# Patient Record
Sex: FEMALE | Race: WHITE | NOT HISPANIC OR LATINO | URBAN - METROPOLITAN AREA
[De-identification: names, ages, dates, MRNs, and addresses within clinical notes are randomized per-mention and may not be internally consistent; named-entity substitution may affect disease eponyms.]

---

## 2020-07-25 ENCOUNTER — TELEPHONE ENCOUNTER (OUTPATIENT)
Dept: URBAN - METROPOLITAN AREA CLINIC 13 | Facility: CLINIC | Age: 65
End: 2020-07-25

## 2020-07-25 RX ORDER — ESOMEPRAZOLE MAGNESIUM 40 MG/1
TAKE 1 CAPSULE ONCE DAILY CAPSULE, DELAYED RELEASE PELLETS ORAL
Refills: 0 | OUTPATIENT
Start: 2017-09-25 | End: 2018-05-17

## 2020-07-25 RX ORDER — ESOMEPRAZOLE MAGNESIUM 40 MG/1
TAKE ONE CAPSULE BY MOUTH TWICE A DAY CAPSULE, DELAYED RELEASE PELLETS ORAL
Qty: 180 | Refills: 3 | OUTPATIENT
Start: 2018-01-15 | End: 2019-01-29

## 2020-07-26 ENCOUNTER — TELEPHONE ENCOUNTER (OUTPATIENT)
Dept: URBAN - METROPOLITAN AREA CLINIC 13 | Facility: CLINIC | Age: 65
End: 2020-07-26

## 2020-07-26 RX ORDER — PRAVASTATIN SODIUM 40 MG/1
TAKE 1 TABLET AT BEDTIME TABLET ORAL
Refills: 0 | Status: ACTIVE | COMMUNITY
Start: 2016-11-21

## 2020-07-26 RX ORDER — CYCLOSPORINE 0.5 MG/ML
INSTILL 1 DROP IN BOTH EYES EVERY 12 HOURS DAILY EMULSION OPHTHALMIC
Refills: 0 | Status: ACTIVE | COMMUNITY
Start: 2016-12-01

## 2020-07-26 RX ORDER — AZITHROMYCIN DIHYDRATE 250 MG/1
TABLET, FILM COATED ORAL
Qty: 11 | Refills: 0 | Status: ACTIVE | COMMUNITY
Start: 2017-05-30

## 2020-07-26 RX ORDER — ESTRADIOL 0.05 MG/D
APPLY 1 PATCH TWICE WEEKLY AS DIRECTED FILM, EXTENDED RELEASE TRANSDERMAL
Refills: 0 | Status: ACTIVE | COMMUNITY
Start: 2017-01-23

## 2020-07-26 RX ORDER — CELECOXIB 100 MG/1
TAKE 1 CAPSULE TWICE DAILY AS NEEDED CAPSULE ORAL
Refills: 0 | Status: ACTIVE | COMMUNITY
Start: 2017-02-17

## 2020-07-26 RX ORDER — VALSARTAN AND HYDROCHLOROTHIAZIDE 160; 12.5 MG/1; MG/1
TAKE 1 TABLET DAILY TABLET, FILM COATED ORAL
Refills: 0 | Status: ACTIVE | COMMUNITY
Start: 2017-02-24

## 2020-07-26 RX ORDER — DEXLANSOPRAZOLE 60 MG/1
CAPSULE, DELAYED RELEASE ORAL
Qty: 90 | Refills: 0 | Status: ACTIVE | COMMUNITY
Start: 2016-11-21

## 2020-07-26 RX ORDER — EPINEPHRINE 0.3 MG/.3ML
INJECT 0.3ML INTRAMUSCULARLY AS DIRECTED INJECTION INTRAMUSCULAR
Refills: 0 | Status: ACTIVE | COMMUNITY
Start: 2016-12-20

## 2020-07-26 RX ORDER — PREDNISONE 5 MG/1
TABLET ORAL
Qty: 55 | Refills: 0 | Status: ACTIVE | COMMUNITY
Start: 2017-01-13

## 2020-07-26 RX ORDER — AZELASTINE HYDROCHLORIDE AND FLUTICASONE PROPIONATE 137; 50 UG/1; UG/1
SPRAY, METERED NASAL
Qty: 23 | Refills: 0 | Status: ACTIVE | COMMUNITY
Start: 2016-12-21

## 2020-07-26 RX ORDER — RABEPRAZOLE SODIUM 20 MG/1
TAKE ONE TABLET BY MOUTH TWICE A DAY BEFORE MEALS TABLET, DELAYED RELEASE ORAL
Qty: 180 | Refills: 2 | Status: ACTIVE | COMMUNITY
Start: 2019-01-29

## 2020-07-26 RX ORDER — TIZANIDINE 4 MG/1
TAKE 1 OR 2 TABLETS EVERY 8 HOURS AS NEEDED TABLET ORAL
Refills: 0 | Status: ACTIVE | COMMUNITY
Start: 2017-03-27

## 2020-07-26 RX ORDER — IPRATROPIUM BROMIDE 21 UG/1
USE 2 SPRAYS IN EACH NOSTRIL 2-3 TIMES DAILY SPRAY, METERED NASAL
Refills: 0 | Status: ACTIVE | COMMUNITY
Start: 2016-12-21

## 2020-07-26 RX ORDER — BUDESONIDE AND FORMOTEROL FUMARATE DIHYDRATE 80; 4.5 UG/1; UG/1
INHALE 2 PUFFS TWICE DAILY. RINSE MOUTH AFTER USE AEROSOL RESPIRATORY (INHALATION)
Refills: 0 | Status: ACTIVE | COMMUNITY
Start: 2016-12-23

## 2020-07-26 RX ORDER — AZITHROMYCIN DIHYDRATE 250 MG/1
TABLET, FILM COATED ORAL
Qty: 21 | Refills: 0 | Status: ACTIVE | COMMUNITY
Start: 2017-01-13

## 2020-07-26 RX ORDER — GUAIFENESIN 600 MG/1
TAKE 1 OR 2 TABLETS TWICE DAILY AS NEEDED FOR CONGESTION TABLET, EXTENDED RELEASE ORAL
Refills: 0 | Status: ACTIVE | COMMUNITY

## 2020-07-26 RX ORDER — CEFUROXIME AXETIL 500 MG/1
TABLET, FILM COATED ORAL
Qty: 20 | Refills: 0 | Status: ACTIVE | COMMUNITY
Start: 2017-01-06

## 2020-07-26 RX ORDER — AZELASTINE HYDROCHLORIDE AND FLUTICASONE PROPIONATE 137; 50 UG/1; UG/1
SPRAY, METERED NASAL
Qty: 23 | Refills: 0 | Status: ACTIVE | COMMUNITY
Start: 2017-01-23

## 2020-07-26 RX ORDER — AZELASTINE HYDROCHLORIDE AND FLUTICASONE PROPIONATE 137; 50 UG/1; UG/1
INSTILL 1 SQUIRT TWICE DAILY SPRAY, METERED NASAL
Refills: 0 | Status: ACTIVE | COMMUNITY

## 2020-07-26 RX ORDER — FUROSEMIDE 20 MG/1
TABLET ORAL
Qty: 30 | Refills: 0 | Status: ACTIVE | COMMUNITY
Start: 2017-08-24

## 2020-07-26 RX ORDER — IPRATROPIUM BROMIDE 42 UG/1
SPRAY, METERED NASAL
Qty: 15 | Refills: 0 | Status: ACTIVE | COMMUNITY
Start: 2017-10-10

## 2020-07-26 RX ORDER — PREDNISONE 10 MG/1
TABLET ORAL
Qty: 10 | Refills: 0 | Status: ACTIVE | COMMUNITY
Start: 2016-12-20

## 2020-07-26 RX ORDER — RABEPRAZOLE SODIUM 20 MG/1
TABLET, DELAYED RELEASE ORAL
Qty: 30 | Refills: 0 | Status: ACTIVE | COMMUNITY
Start: 2017-05-16

## 2020-07-26 RX ORDER — RABEPRAZOLE SODIUM 20 MG/1
TABLET, DELAYED RELEASE ORAL
Qty: 30 | Refills: 0 | Status: ACTIVE | COMMUNITY
Start: 2017-06-20

## 2020-07-26 RX ORDER — DULOXETINE 30 MG/1
CAPSULE, DELAYED RELEASE PELLETS ORAL
Qty: 30 | Refills: 0 | Status: ACTIVE | COMMUNITY
Start: 2017-08-24

## 2020-07-26 RX ORDER — ESZOPICLONE 2 MG/1
TABLET, FILM COATED ORAL
Qty: 15 | Refills: 0 | Status: ACTIVE | COMMUNITY
Start: 2017-08-25

## 2020-07-26 RX ORDER — LEVOTHYROXINE SODIUM 0.07 MG/1
TAKE 1 TABLET DAILY TABLET ORAL
Refills: 0 | Status: ACTIVE | COMMUNITY
Start: 2017-09-25

## 2020-07-26 RX ORDER — CITALOPRAM 40 MG/1
TAKE 1 TABLET DAILY TABLET ORAL
Refills: 0 | Status: ACTIVE | COMMUNITY
Start: 2016-12-16

## 2020-07-26 RX ORDER — MONTELUKAST SODIUM 10 MG/1
TAKE 1 TABLET AT BEDTIME TABLET, FILM COATED ORAL
Refills: 0 | Status: ACTIVE | COMMUNITY
Start: 2017-01-30

## 2022-01-28 ENCOUNTER — LAB OUTSIDE AN ENCOUNTER (OUTPATIENT)
Dept: URBAN - METROPOLITAN AREA CLINIC 113 | Facility: CLINIC | Age: 67
End: 2022-01-28

## 2022-01-28 ENCOUNTER — DASHBOARD ENCOUNTERS (OUTPATIENT)
Age: 67
End: 2022-01-28

## 2022-01-28 ENCOUNTER — WEB ENCOUNTER (OUTPATIENT)
Dept: URBAN - METROPOLITAN AREA CLINIC 113 | Facility: CLINIC | Age: 67
End: 2022-01-28

## 2022-01-28 ENCOUNTER — OFFICE VISIT (OUTPATIENT)
Dept: URBAN - METROPOLITAN AREA CLINIC 113 | Facility: CLINIC | Age: 67
End: 2022-01-28
Payer: MEDICARE

## 2022-01-28 VITALS
HEIGHT: 65 IN | BODY MASS INDEX: 36.32 KG/M2 | TEMPERATURE: 97.5 F | SYSTOLIC BLOOD PRESSURE: 147 MMHG | DIASTOLIC BLOOD PRESSURE: 81 MMHG | HEART RATE: 91 BPM | WEIGHT: 218 LBS | RESPIRATION RATE: 20 BRPM

## 2022-01-28 DIAGNOSIS — K21.9 GASTROESOPHAGEAL REFLUX DISEASE WITHOUT ESOPHAGITIS: ICD-10-CM

## 2022-01-28 DIAGNOSIS — L98.9 SKIN ABNORMALITY: ICD-10-CM

## 2022-01-28 DIAGNOSIS — R19.4 CHANGE IN BOWEL HABITS: ICD-10-CM

## 2022-01-28 DIAGNOSIS — K59.09 OTHER CONSTIPATION: ICD-10-CM

## 2022-01-28 DIAGNOSIS — R11.0 NAUSEA: ICD-10-CM

## 2022-01-28 DIAGNOSIS — K62.89 PROCTALGIA: ICD-10-CM

## 2022-01-28 PROCEDURE — 99204 OFFICE O/P NEW MOD 45 MIN: CPT | Performed by: NURSE PRACTITIONER

## 2022-01-28 RX ORDER — ESOMEPRAZOLE MAGNESIUM 40 MG/1
1 CAPSULE CAPSULE, DELAYED RELEASE ORAL TWICE A DAY
Status: ACTIVE | COMMUNITY

## 2022-01-28 RX ORDER — FUROSEMIDE 20 MG/1
TABLET ORAL
Qty: 30 | Refills: 0 | Status: ON HOLD | COMMUNITY
Start: 2017-08-24

## 2022-01-28 RX ORDER — PREDNISONE 10 MG/1
TABLET ORAL
Qty: 10 | Refills: 0 | Status: ON HOLD | COMMUNITY
Start: 2016-12-20

## 2022-01-28 RX ORDER — CELECOXIB 100 MG/1
TAKE 1 CAPSULE TWICE DAILY AS NEEDED CAPSULE ORAL
Refills: 0 | Status: ON HOLD | COMMUNITY
Start: 2017-02-17

## 2022-01-28 RX ORDER — VALSARTAN AND HYDROCHLOROTHIAZIDE 80; 12.5 MG/1; MG/1
1 TABLET TABLET, FILM COATED ORAL ONCE A DAY
Status: ACTIVE | COMMUNITY

## 2022-01-28 RX ORDER — SULFASALAZINE 500 MG/1
2 TABLETS TABLET ORAL TWICE DAILY
Status: ACTIVE | COMMUNITY

## 2022-01-28 RX ORDER — MONTELUKAST SODIUM 10 MG/1
TAKE 1 TABLET AT BEDTIME TABLET, FILM COATED ORAL
Refills: 0 | Status: ACTIVE | COMMUNITY
Start: 2017-01-30

## 2022-01-28 RX ORDER — GOLIMUMAB 50 MG/4ML
AS DIRECTED SOLUTION INTRAVENOUS
Status: ACTIVE | COMMUNITY

## 2022-01-28 RX ORDER — DEXLANSOPRAZOLE 60 MG/1
CAPSULE, DELAYED RELEASE ORAL
Qty: 90 | Refills: 0 | Status: ON HOLD | COMMUNITY
Start: 2016-11-21

## 2022-01-28 RX ORDER — ESTRADIOL 0.05 MG/D
APPLY 1 PATCH TWICE WEEKLY AS DIRECTED FILM, EXTENDED RELEASE TRANSDERMAL
Refills: 0 | Status: ACTIVE | COMMUNITY
Start: 2017-01-23

## 2022-01-28 RX ORDER — CYCLOSPORINE 0.5 MG/ML
INSTILL 1 DROP IN BOTH EYES EVERY 12 HOURS DAILY EMULSION OPHTHALMIC
Refills: 0 | Status: ACTIVE | COMMUNITY
Start: 2016-12-01

## 2022-01-28 RX ORDER — AZITHROMYCIN DIHYDRATE 250 MG/1
TABLET, FILM COATED ORAL
Qty: 21 | Refills: 0 | Status: ON HOLD | COMMUNITY
Start: 2017-01-13

## 2022-01-28 RX ORDER — VALSARTAN AND HYDROCHLOROTHIAZIDE 160; 12.5 MG/1; MG/1
TAKE 1 TABLET DAILY TABLET, FILM COATED ORAL
Refills: 0 | Status: ON HOLD | COMMUNITY
Start: 2017-02-24

## 2022-01-28 RX ORDER — DULOXETINE 30 MG/1
1 CAPSULE CAPSULE, DELAYED RELEASE PELLETS ORAL TWICE A DAY
Refills: 0 | Status: ACTIVE | COMMUNITY
Start: 2017-08-24

## 2022-01-28 RX ORDER — GUAIFENESIN 600 MG/1
TAKE 1 OR 2 TABLETS TWICE DAILY AS NEEDED FOR CONGESTION TABLET, EXTENDED RELEASE ORAL
Refills: 0 | Status: ON HOLD | COMMUNITY

## 2022-01-28 RX ORDER — METAXALONE 800 MG/1
1 TABLET TABLET ORAL THREE TIMES A DAY
Status: ACTIVE | COMMUNITY

## 2022-01-28 RX ORDER — SODIUM, POTASSIUM,MAG SULFATES 17.5-3.13G
354 ML SOLUTION, RECONSTITUTED, ORAL ORAL ONCE
Qty: 354 MILLILITER | Refills: 0 | OUTPATIENT
Start: 2022-01-28 | End: 2022-01-29

## 2022-01-28 RX ORDER — PRAVASTATIN SODIUM 40 MG/1
TAKE 1 TABLET AT BEDTIME TABLET ORAL
Refills: 0 | Status: ACTIVE | COMMUNITY
Start: 2016-11-21

## 2022-01-28 RX ORDER — IPRATROPIUM BROMIDE 21 UG/1
USE 2 SPRAYS IN EACH NOSTRIL 2-3 TIMES DAILY SPRAY, METERED NASAL
Refills: 0 | Status: ON HOLD | COMMUNITY
Start: 2016-12-21

## 2022-01-28 RX ORDER — IPRATROPIUM BROMIDE 42 UG/1
SPRAY, METERED NASAL
Qty: 15 | Refills: 0 | Status: ON HOLD | COMMUNITY
Start: 2017-10-10

## 2022-01-28 RX ORDER — DULOXETINE 60 MG/1
1 CAPSULE CAPSULE, DELAYED RELEASE PELLETS ORAL TWICE A DAY
Status: ACTIVE | COMMUNITY

## 2022-01-28 RX ORDER — PREDNISONE 5 MG/1
TABLET ORAL
Qty: 55 | Refills: 0 | Status: ON HOLD | COMMUNITY
Start: 2017-01-13

## 2022-01-28 RX ORDER — AMLODIPINE BESYLATE 2.5 MG
1 TABLET TABLET ORAL ONCE A DAY
Status: ACTIVE | COMMUNITY

## 2022-01-28 RX ORDER — CEFUROXIME AXETIL 500 MG/1
TABLET, FILM COATED ORAL
Qty: 20 | Refills: 0 | Status: ON HOLD | COMMUNITY
Start: 2017-01-06

## 2022-01-28 RX ORDER — CITALOPRAM 40 MG/1
TAKE 1 TABLET DAILY TABLET ORAL
Refills: 0 | Status: ON HOLD | COMMUNITY
Start: 2016-12-16

## 2022-01-28 RX ORDER — BUDESONIDE AND FORMOTEROL FUMARATE DIHYDRATE 80; 4.5 UG/1; UG/1
INHALE 2 PUFFS TWICE DAILY. RINSE MOUTH AFTER USE AEROSOL RESPIRATORY (INHALATION)
Refills: 0 | Status: ACTIVE | COMMUNITY
Start: 2016-12-23

## 2022-01-28 RX ORDER — TIZANIDINE 4 MG/1
TAKE 1 OR 2 TABLETS EVERY 8 HOURS AS NEEDED TABLET ORAL
Refills: 0 | Status: ON HOLD | COMMUNITY
Start: 2017-03-27

## 2022-01-28 RX ORDER — DICLOFENAC SODIUM 75 MG/1
1 TABLET AS NEEDED TABLET, DELAYED RELEASE ORAL TWICE A DAY
Status: ACTIVE | COMMUNITY

## 2022-01-28 RX ORDER — FLUTICASONE PROPIONATE 50 UG/1
1 SPRAY IN EACH NOSTRIL SPRAY, METERED NASAL ONCE A DAY
Status: ACTIVE | COMMUNITY

## 2022-01-28 RX ORDER — ESZOPICLONE 2 MG/1
TABLET, FILM COATED ORAL
Qty: 15 | Refills: 0 | Status: ON HOLD | COMMUNITY
Start: 2017-08-25

## 2022-01-28 RX ORDER — AZELASTINE HYDROCHLORIDE AND FLUTICASONE PROPIONATE 137; 50 UG/1; UG/1
INSTILL 1 SQUIRT TWICE DAILY SPRAY, METERED NASAL
Refills: 0 | Status: ON HOLD | COMMUNITY

## 2022-01-28 RX ORDER — RABEPRAZOLE SODIUM 20 MG/1
TABLET, DELAYED RELEASE ORAL
Qty: 30 | Refills: 0 | Status: ON HOLD | COMMUNITY
Start: 2017-05-16

## 2022-01-28 RX ORDER — EPINEPHRINE 0.3 MG/.3ML
INJECT 0.3ML INTRAMUSCULARLY AS DIRECTED INJECTION INTRAMUSCULAR
Refills: 0 | Status: ON HOLD | COMMUNITY
Start: 2016-12-20

## 2022-01-28 RX ORDER — LEVOTHYROXINE SODIUM 0.09 MG/1
TAKE 1 TABLET DAILY TABLET ORAL
Refills: 0 | Status: ON HOLD | COMMUNITY
Start: 2017-09-25

## 2022-01-28 RX ORDER — CETIRIZINE HYDROCHLORIDE 10 MG/1
1 TABLET TABLET, FILM COATED ORAL ONCE A DAY
Status: ACTIVE | COMMUNITY

## 2022-01-28 NOTE — PHYSICAL EXAM RECTAL:
normal tone , no external hemorrhoids , no masses palpable , no melena , no red blood , No tenderness on ADELA , Skin tags are present.; oval area of translucent scaling (~[id number]) right buttock near cleft noted

## 2022-01-28 NOTE — HPI-TODAY'S VISIT:
This is a 66-year-old female with a history of hypertension, hyperlipidemia, hypothyroidism, sleep apnea, seronegative rheumatoid arthritis on Simponi and sulfasalazine, GERD, diverticulosis, and a maternal history of advanced colon polyps due screening in December 2022 presenting for evaluation of rectal pain and a change in bowel habits. She was last seen in our office in 2018.  Epigastric pain and acid reflux symptoms had improved on esomeprazole 40 mg twice a day. She reports onset of rectal pain and constipation less than a year again.  She has frequent proctalgia further described as discomfort and pressure exacerbated by sitting on a hard surface.  She has rare red blood per rectum associated with hard stools or straining.  She has a frequent sensation of incomplete evacuation.  She is having a bowel movement daily.  Her stools are infrequently hard.  She has been taking Benefiber 2 teaspoons every morning most days and drinking water.  She reports intermittent narrow stools.  She denies melena.  Heartburn is controlled with esomeprazole 40 mg twice a day.  She has occasional nausea that she suspects may be related to nerves associated with her mother's worsening dementia.  She denies abdominal pain or any other abdominal symptoms.

## 2022-01-28 NOTE — HPI-OTHER HISTORIES
EGD 12/26/2017:Normal esophagus, small hiatal hernia, gaping lower esophageal sphincter, gastritis.  Pathology: Stomach body and antral biopsies demonstrated mild chronic gastritis and mild foveolar hyperplasia negative for H. pylori. Colonoscopy 12/26/2017:BBPS 9, sigmoid and descending diverticulosis, otherwise normal

## 2022-01-29 PROBLEM — 266435005: Status: ACTIVE | Noted: 2022-01-29

## 2022-01-29 PROBLEM — 422587007: Status: ACTIVE | Noted: 2022-01-29

## 2022-01-29 PROBLEM — 14760008: Status: ACTIVE | Noted: 2022-01-29

## 2022-01-29 PROBLEM — 95320005: Status: ACTIVE | Noted: 2022-01-29

## 2022-02-26 PROBLEM — 77880009: Status: ACTIVE | Noted: 2022-01-28

## 2022-02-26 PROBLEM — 129851009: Status: ACTIVE | Noted: 2022-01-28

## 2022-03-08 ENCOUNTER — TELEPHONE ENCOUNTER (OUTPATIENT)
Dept: URBAN - METROPOLITAN AREA CLINIC 113 | Facility: CLINIC | Age: 67
End: 2022-03-08

## 2022-03-08 RX ORDER — SODIUM, POTASSIUM,MAG SULFATES 17.5-3.13G
354 ML SOLUTION, RECONSTITUTED, ORAL ORAL ONCE
Qty: 354 MILLILITER | Refills: 0
Start: 2022-01-28 | End: 2022-03-09

## 2022-03-16 ENCOUNTER — OFFICE VISIT (OUTPATIENT)
Dept: URBAN - METROPOLITAN AREA SURGERY CENTER 25 | Facility: SURGERY CENTER | Age: 67
End: 2022-03-16
Payer: MEDICARE

## 2022-03-16 DIAGNOSIS — Z83.71 FAMILY HISTORY OF COLON POLYPS: ICD-10-CM

## 2022-03-16 DIAGNOSIS — K59.00 CONSTIPATION: ICD-10-CM

## 2022-03-16 PROCEDURE — 45378 DIAGNOSTIC COLONOSCOPY: CPT | Performed by: INTERNAL MEDICINE

## 2022-03-16 PROCEDURE — G8907 PT DOC NO EVENTS ON DISCHARG: HCPCS | Performed by: INTERNAL MEDICINE

## 2022-03-16 RX ORDER — AZELASTINE HYDROCHLORIDE AND FLUTICASONE PROPIONATE 137; 50 UG/1; UG/1
INSTILL 1 SQUIRT TWICE DAILY SPRAY, METERED NASAL
Refills: 0 | Status: ON HOLD | COMMUNITY

## 2022-03-16 RX ORDER — TIZANIDINE 4 MG/1
TAKE 1 OR 2 TABLETS EVERY 8 HOURS AS NEEDED TABLET ORAL
Refills: 0 | Status: ON HOLD | COMMUNITY
Start: 2017-03-27

## 2022-03-16 RX ORDER — CETIRIZINE HYDROCHLORIDE 10 MG/1
1 TABLET TABLET, FILM COATED ORAL ONCE A DAY
Status: ACTIVE | COMMUNITY

## 2022-03-16 RX ORDER — IPRATROPIUM BROMIDE 21 UG/1
USE 2 SPRAYS IN EACH NOSTRIL 2-3 TIMES DAILY SPRAY, METERED NASAL
Refills: 0 | Status: ON HOLD | COMMUNITY
Start: 2016-12-21

## 2022-03-16 RX ORDER — FUROSEMIDE 20 MG/1
TABLET ORAL
Qty: 30 | Refills: 0 | Status: ON HOLD | COMMUNITY
Start: 2017-08-24

## 2022-03-16 RX ORDER — RABEPRAZOLE SODIUM 20 MG/1
TABLET, DELAYED RELEASE ORAL
Qty: 30 | Refills: 0 | Status: ON HOLD | COMMUNITY
Start: 2017-05-16

## 2022-03-16 RX ORDER — METAXALONE 800 MG/1
1 TABLET TABLET ORAL THREE TIMES A DAY
Status: ACTIVE | COMMUNITY

## 2022-03-16 RX ORDER — CITALOPRAM 40 MG/1
TAKE 1 TABLET DAILY TABLET ORAL
Refills: 0 | Status: ON HOLD | COMMUNITY
Start: 2016-12-16

## 2022-03-16 RX ORDER — IPRATROPIUM BROMIDE 42 UG/1
SPRAY, METERED NASAL
Qty: 15 | Refills: 0 | Status: ON HOLD | COMMUNITY
Start: 2017-10-10

## 2022-03-16 RX ORDER — LEVOTHYROXINE SODIUM 0.09 MG/1
TAKE 1 TABLET DAILY TABLET ORAL
Refills: 0 | Status: ON HOLD | COMMUNITY
Start: 2017-09-25

## 2022-03-16 RX ORDER — CYCLOSPORINE 0.5 MG/ML
INSTILL 1 DROP IN BOTH EYES EVERY 12 HOURS DAILY EMULSION OPHTHALMIC
Refills: 0 | Status: ACTIVE | COMMUNITY
Start: 2016-12-01

## 2022-03-16 RX ORDER — GOLIMUMAB 50 MG/4ML
AS DIRECTED SOLUTION INTRAVENOUS
Status: ACTIVE | COMMUNITY

## 2022-03-16 RX ORDER — FLUTICASONE PROPIONATE 50 UG/1
1 SPRAY IN EACH NOSTRIL SPRAY, METERED NASAL ONCE A DAY
Status: ACTIVE | COMMUNITY

## 2022-03-16 RX ORDER — AMLODIPINE BESYLATE 2.5 MG
1 TABLET TABLET ORAL ONCE A DAY
Status: ACTIVE | COMMUNITY

## 2022-03-16 RX ORDER — EPINEPHRINE 0.3 MG/.3ML
INJECT 0.3ML INTRAMUSCULARLY AS DIRECTED INJECTION INTRAMUSCULAR
Refills: 0 | Status: ON HOLD | COMMUNITY
Start: 2016-12-20

## 2022-03-16 RX ORDER — DULOXETINE 60 MG/1
1 CAPSULE CAPSULE, DELAYED RELEASE PELLETS ORAL TWICE A DAY
Status: ACTIVE | COMMUNITY

## 2022-03-16 RX ORDER — PREDNISONE 10 MG/1
TABLET ORAL
Qty: 10 | Refills: 0 | Status: ON HOLD | COMMUNITY
Start: 2016-12-20

## 2022-03-16 RX ORDER — BUDESONIDE AND FORMOTEROL FUMARATE DIHYDRATE 80; 4.5 UG/1; UG/1
INHALE 2 PUFFS TWICE DAILY. RINSE MOUTH AFTER USE AEROSOL RESPIRATORY (INHALATION)
Refills: 0 | Status: ACTIVE | COMMUNITY
Start: 2016-12-23

## 2022-03-16 RX ORDER — MONTELUKAST SODIUM 10 MG/1
TAKE 1 TABLET AT BEDTIME TABLET, FILM COATED ORAL
Refills: 0 | Status: ACTIVE | COMMUNITY
Start: 2017-01-30

## 2022-03-16 RX ORDER — DEXLANSOPRAZOLE 60 MG/1
CAPSULE, DELAYED RELEASE ORAL
Qty: 90 | Refills: 0 | Status: ON HOLD | COMMUNITY
Start: 2016-11-21

## 2022-03-16 RX ORDER — VALSARTAN AND HYDROCHLOROTHIAZIDE 80; 12.5 MG/1; MG/1
1 TABLET TABLET, FILM COATED ORAL ONCE A DAY
Status: ACTIVE | COMMUNITY

## 2022-03-16 RX ORDER — DICLOFENAC SODIUM 75 MG/1
1 TABLET AS NEEDED TABLET, DELAYED RELEASE ORAL TWICE A DAY
Status: ACTIVE | COMMUNITY

## 2022-03-16 RX ORDER — PRAVASTATIN SODIUM 40 MG/1
TAKE 1 TABLET AT BEDTIME TABLET ORAL
Refills: 0 | Status: ACTIVE | COMMUNITY
Start: 2016-11-21

## 2022-03-16 RX ORDER — DULOXETINE 30 MG/1
1 CAPSULE CAPSULE, DELAYED RELEASE PELLETS ORAL TWICE A DAY
Refills: 0 | Status: ACTIVE | COMMUNITY
Start: 2017-08-24

## 2022-03-16 RX ORDER — AZITHROMYCIN DIHYDRATE 250 MG/1
TABLET, FILM COATED ORAL
Qty: 21 | Refills: 0 | Status: ON HOLD | COMMUNITY
Start: 2017-01-13

## 2022-03-16 RX ORDER — VALSARTAN AND HYDROCHLOROTHIAZIDE 160; 12.5 MG/1; MG/1
TAKE 1 TABLET DAILY TABLET, FILM COATED ORAL
Refills: 0 | Status: ON HOLD | COMMUNITY
Start: 2017-02-24

## 2022-03-16 RX ORDER — CEFUROXIME AXETIL 500 MG/1
TABLET, FILM COATED ORAL
Qty: 20 | Refills: 0 | Status: ON HOLD | COMMUNITY
Start: 2017-01-06

## 2022-03-16 RX ORDER — ESZOPICLONE 2 MG/1
TABLET, FILM COATED ORAL
Qty: 15 | Refills: 0 | Status: ON HOLD | COMMUNITY
Start: 2017-08-25

## 2022-03-16 RX ORDER — ESOMEPRAZOLE MAGNESIUM 40 MG/1
1 CAPSULE CAPSULE, DELAYED RELEASE ORAL TWICE A DAY
Status: ACTIVE | COMMUNITY

## 2022-03-16 RX ORDER — ESTRADIOL 0.05 MG/D
APPLY 1 PATCH TWICE WEEKLY AS DIRECTED FILM, EXTENDED RELEASE TRANSDERMAL
Refills: 0 | Status: ACTIVE | COMMUNITY
Start: 2017-01-23

## 2022-03-16 RX ORDER — GUAIFENESIN 600 MG/1
TAKE 1 OR 2 TABLETS TWICE DAILY AS NEEDED FOR CONGESTION TABLET, EXTENDED RELEASE ORAL
Refills: 0 | Status: ON HOLD | COMMUNITY

## 2022-03-16 RX ORDER — CELECOXIB 100 MG/1
TAKE 1 CAPSULE TWICE DAILY AS NEEDED CAPSULE ORAL
Refills: 0 | Status: ON HOLD | COMMUNITY
Start: 2017-02-17

## 2022-03-16 RX ORDER — SULFASALAZINE 500 MG/1
2 TABLETS TABLET ORAL TWICE DAILY
Status: ACTIVE | COMMUNITY

## 2022-03-16 RX ORDER — PREDNISONE 5 MG/1
TABLET ORAL
Qty: 55 | Refills: 0 | Status: ON HOLD | COMMUNITY
Start: 2017-01-13

## 2022-05-16 ENCOUNTER — OFFICE VISIT (OUTPATIENT)
Dept: URBAN - METROPOLITAN AREA CLINIC 113 | Facility: CLINIC | Age: 67
End: 2022-05-16